# Patient Record
(demographics unavailable — no encounter records)

---

## 2024-10-07 NOTE — REVIEW OF SYSTEMS
[Fatigue: Grade 0] : Fatigue: Grade 0 [Skin Hyperpigmentation: Grade 1 - Hyperpigmentation covering <10% BSA; no psychosocial impact] : Skin Hyperpigmentation: Grade 1 - Hyperpigmentation covering <10% BSA; no psychosocial impact [Dermatitis Radiation: Grade 0] : Dermatitis Radiation: Grade 0 [Negative] : Allergic/Immunologic

## 2024-10-14 NOTE — REASON FOR VISIT
[Routine Follow-Up] : routine follow-up visit for [Other: ___] : [unfilled] [Family Member] : family member [Other: ______] : provided by PAYAM [Interpreters_IDNumber] : [id number] [Interpreters_FullName] : Danilo [TWNoteComboBox1] : [place]

## 2024-10-14 NOTE — VITALS
[Maximal Pain Intensity: 0/10] : 0/10 [Least Pain Intensity: 0/10] : 0/10 [Pain Description/Quality: ___] : Pain description/quality: [unfilled] [Pain Duration: ___] : Pain duration: [unfilled] [Pain Location: ___] : Pain Location: [unfilled] [80: Normal activity with effort; some signs or symptoms of disease.] : 80: Normal activity with effort; some signs or symptoms of disease.  [ECOG Performance Status: 1 - Restricted in physically strenuous activity but ambulatory and able to carry out work of a light or sedentary nature] : Performance Status: 1 - Restricted in physically strenuous activity but ambulatory and able to carry out work of a light or sedentary nature, e.g., light house work, office work [Pain Interferes with ADLs] : Pain does not interfere with activities of daily living

## 2024-10-14 NOTE — PHYSICAL EXAM
[Abdomen Soft] : soft [Nondistended] : nondistended [Normal] : no palpable adenopathy [No Focal Deficits] : no focal deficits [Oriented To Time, Place, And Person] : oriented to person, place, and time [de-identified] : Sugical scar over the right anterior thigh, no palpale area of tenderness [de-identified] : right thigh skin intact mild hyperpigmentation around surgical scar

## 2024-10-14 NOTE — HISTORY OF PRESENT ILLNESS
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 Biopsy on 9/21/2023.   Surgical Pathology Report - Auth (Verified) Specimen(s) Submitted 1  Right thigh Final Diagnosis 1. 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 Note: The tumor is composed of spindle cells and markedly pleomorphic  cells in a variably myxoid stroma with delicate vessels. Performed  immunostains show that the tumor cells are positive for SMA, focally  positive for CD34 and negative for desmin, MDM2, AE1/AE3, S100, SOX10,  and ERG. The overall findings are of a high-grade spindle and pleomorphic  sarcoma and the differential includes myxofibrosarcoma, amongst others.  10/4/2023 S/p resection of right thigh mass. 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 Surgical margins are negative. The tumor is within 1 mm of the deep margin.  10/5/2023 CT Chest, Abdomen and Pelvis: No evidence for metastatic disease within the chest, abdomen, or pelvis.   1/18/2024 Completed RT to right thigh total dose of 6600cGy.   9/23/2024 CT Chest: IMPRESSION: 2 mm right lung nodules are unchanged as described above. Continued follow-up with noncontrast chest CT is recommended.  9/23/2024 MRI Lower Ext: IMPRESSION: Stable postsurgical/posttreatment changes along the anterior margin of right thigh without MR evidence for locoregional sarcoma recurrence.  Presents today for follow up. She denies any pain or discomfort in the lower extremity

## 2024-10-14 NOTE — PHYSICAL EXAM
[Abdomen Soft] : soft [Nondistended] : nondistended [Normal] : no palpable adenopathy [No Focal Deficits] : no focal deficits [Oriented To Time, Place, And Person] : oriented to person, place, and time [de-identified] : Sugical scar over the right anterior thigh, no palpale area of tenderness [de-identified] : right thigh skin intact mild hyperpigmentation around surgical scar

## 2024-10-14 NOTE — REASON FOR VISIT
[Routine Follow-Up] : routine follow-up visit for [Other: ___] : [unfilled] [Family Member] : family member [Other: ______] : provided by PAYAM [Interpreters_IDNumber] : [id number] [Interpreters_FullName] : Danilo [TWNoteComboBox1] : Bangladeshi

## 2024-10-14 NOTE — REASON FOR VISIT
[Routine Follow-Up] : routine follow-up visit for [Other: ___] : [unfilled] [Family Member] : family member [Other: ______] : provided by PAYAM [Interpreters_IDNumber] : [id number] [Interpreters_FullName] : Danilo [TWNoteComboBox1] : [place]

## 2024-10-14 NOTE — PHYSICAL EXAM
[Abdomen Soft] : soft [Nondistended] : nondistended [Normal] : no palpable adenopathy [No Focal Deficits] : no focal deficits [Oriented To Time, Place, And Person] : oriented to person, place, and time [de-identified] : Sugical scar over the right anterior thigh, no palpale area of tenderness [de-identified] : right thigh skin intact mild hyperpigmentation around surgical scar

## 2025-01-07 NOTE — REVIEW OF SYSTEMS
[Negative] : Allergic/Immunologic [Fatigue: Grade 0] : Fatigue: Grade 0 [Skin Hyperpigmentation: Grade 1 - Hyperpigmentation covering <10% BSA; no psychosocial impact] : Skin Hyperpigmentation: Grade 1 - Hyperpigmentation covering <10% BSA; no psychosocial impact [Dermatitis Radiation: Grade 0] : Dermatitis Radiation: Grade 0

## 2025-01-13 NOTE — REASON FOR VISIT
[Routine Follow-Up] : routine follow-up visit for [Other: ___] : [unfilled] [Other: ______] : provided by PAYAM [Interpreters_IDNumber] : [id number] [Interpreters_FullName] : Danilo [TWNoteComboBox1] : Bhutanese

## 2025-01-13 NOTE — REASON FOR VISIT
[Routine Follow-Up] : routine follow-up visit for [Other: ___] : [unfilled] [Other: ______] : provided by PAYAM [Interpreters_IDNumber] : [id number] [Interpreters_FullName] : Danilo [[email or web address]] : Haitian

## 2025-01-13 NOTE — HISTORY OF PRESENT ILLNESS
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 Biopsy on 9/21/2023.   Surgical Pathology of Right thigh mass Final Diagnosis 1. 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 Performed  immunostains show that the tumor cells are positive for SMA, focally positive for CD34 and negative for desmin, MDM2, AE1/AE3, S100, SOX10, and ERG. The overall findings are of a high-grade spindle and pleomorphic sarcoma and the differential includes myxofibrosarcoma, amongst others.  10/4/2023 S/p resection of right thigh mass. 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 Surgical margins are negative. The tumor is within 1 mm of the deep margin.  10/5/2023 CT Chest, Abdomen and Pelvis: No evidence for metastatic disease within the chest, abdomen, or pelvis.   1/18/2024 Completed RT to RIGHT thigh total dose of 6600cGy.   9/23/2024 CT Chest: IMPRESSION: 2 mm right lung nodules are unchanged as described above. Continued follow-up with noncontrast chest CT is recommended.  9/23/2024 MRI Lower Ext: IMPRESSION: Stable postsurgical/posttreatment changes along the anterior margin of right thigh without MR evidence for locoregional sarcoma recurrence.  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 Surgical margins are negative. The tumor is within 1 mm of the deep margin. ***Amendment This is amendment is to correct a typographical error in the margin distance in the synoptic summary. The report is amended as follows: Distance from Tumor to Closest Margin:  0.75 mm  1/13/25 Presents today for follow up. She denies any pain or discomfort in the lower extremity

## 2025-05-22 NOTE — PHYSICAL EXAM
[Normal] : well developed, well nourished, in no acute distress [] : no respiratory distress [Oriented To Time, Place, And Person] : oriented to person, place, and time

## 2025-05-28 NOTE — REASON FOR VISIT
[Routine Follow-Up] : routine follow-up visit for [Other: ___] : [unfilled] [Other: ______] : provided by PAYAM [Interpreters_IDNumber] : [id number] [Interpreters_FullName] : Danilo [Patient Declined  Services] : - None: Patient declined  services [TWNoteComboBox1] : Cym[place]

## 2025-05-28 NOTE — REASON FOR VISIT
[Routine Follow-Up] : routine follow-up visit for [Other: ___] : [unfilled] [Other: ______] : provided by PAYAM [Interpreters_IDNumber] : [id number] [Interpreters_FullName] : Danilo [Patient Declined  Services] : - None: Patient declined  services [TWNoteComboBox1] : Marshallese

## 2025-05-28 NOTE — REASON FOR VISIT
[Routine Follow-Up] : routine follow-up visit for [Other: ___] : [unfilled] [Other: ______] : provided by PAYAM [Interpreters_IDNumber] : [id number] [Interpreters_FullName] : Danilo [Patient Declined  Services] : - None: Patient declined  services [TWNoteComboBox1] : Citizen of [place]

## 2025-05-28 NOTE — HISTORY OF PRESENT ILLNESS
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 Biopsy on 9/21/2023.   Surgical Pathology of Right thigh mass Final Diagnosis 1. 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 Performed  immunostains show that the tumor cells are positive for SMA, focally positive for CD34 and negative for desmin, MDM2, AE1/AE3, S100, SOX10, and ERG. The overall findings are of a high-grade spindle and pleomorphic sarcoma and the differential includes myxofibrosarcoma, amongst others.  10/4/2023 S/p resection of right thigh mass. 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 Surgical margins are negative. The tumor is within 1 mm of the deep margin.  10/5/2023 CT Chest, Abdomen and Pelvis: No evidence for metastatic disease within the chest, abdomen, or pelvis.   1/18/2024 Completed RT to RIGHT thigh total dose of 6600cGy in 33 fractions  9/23/2024 MRI Lower Ext: IMPRESSION: Stable postsurgical/posttreatment changes along the anterior margin of right thigh without MR evidence for locoregional sarcoma recurrence.  1/22/2025 MRI right lower extremity: 1.  No evidence of recurrence or residual disease. 2.  Posttreatment changes of the anterior thigh, similar in appearance to prior study, as described above. 1/22/2025 CT chest: Stable very small nodules in the right middle and right lower lobes when compared to previous exam.  5/14/25 MRI right lower extremity demonstrates post-surgical changes at the anterior thigh, similar in appearance to prior study as described. There is no evidence of tumor recurrence or residual disease.  5/14/25 CT chest: stable (unchanged 2 mm right middle and lower lobe nodules)  5/22/25: She returns today for a follow up. Overall feeling well with no new complaints. Continues with right thigh tightness, no longer going for PT but doing some exercises at home. Following up with Dr. Jimenez in August